# Patient Record
Sex: FEMALE
[De-identification: names, ages, dates, MRNs, and addresses within clinical notes are randomized per-mention and may not be internally consistent; named-entity substitution may affect disease eponyms.]

---

## 2019-11-23 ENCOUNTER — TRANSCRIBE ORDERS (OUTPATIENT)
Dept: OTHER | Age: 54
End: 2019-11-23

## 2019-11-23 DIAGNOSIS — N90.1 VULVAR INTRAEPITHELIAL NEOPLASIA (VIN) GRADE 2: Primary | ICD-10-CM

## 2019-11-26 NOTE — TELEPHONE ENCOUNTER
RECORDS STATUS - ALL OTHER DIAGNOSIS      RECORDS RECEIVED FROM: [facility]   DATE RECEIVED: Care Everywhere   NOTES STATUS DETAILS   OFFICE NOTE from referring provider     OFFICE NOTE from medical oncologist     DISCHARGE SUMMARY from hospital     DISCHARGE REPORT from the ER     OPERATIVE REPORT     MEDICATION LIST     CLINICAL TRIAL TREATMENTS TO DATE     LABS     PATHOLOGY REPORTS     ANYTHING RELATED TO DIAGNOSIS Slides requested 11/26/19  Received 12/3/19 [facility] Case# [id number]   GENONOMIC TESTING     TYPE:     IMAGING (NEED IMAGES & REPORT)     CT SCANS     MRI     MAMMO     ULTRASOUND Requested 11/26/19  Merged to PACS 12/3/19 [facility]   PET

## 2019-11-26 NOTE — TELEPHONE ENCOUNTER
ONCOLOGY INTAKE: Records Information      APPT INFORMATION: [id number] Dr. Gregory Ramirez  Referring provider:  Dr. 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 Yes    Has patient been seen for any external appt for this diagnosis? Yes    If yes, where? [facility]    Has patient had any imaging or procedures outside of [place] for this condition? Yes      If Yes, where? [facility]    ADDITIONAL INFORMATION:  Faxed recs to medical records

## 2019-12-06 PROCEDURE — 00000346 ZZHCL STATISTIC REVIEW OUTSIDE SLIDES TC 88321: Performed by: OBSTETRICS & GYNECOLOGY

## 2019-12-08 LAB — COPATH REPORT: NORMAL

## 2019-12-11 ENCOUNTER — PRE VISIT (OUTPATIENT)
Dept: ONCOLOGY | Facility: CLINIC | Age: 54
End: 2019-12-11

## 2019-12-11 ENCOUNTER — ONCOLOGY VISIT (OUTPATIENT)
Dept: ONCOLOGY | Facility: CLINIC | Age: 54
End: 2019-12-11
Attending: OBSTETRICS & GYNECOLOGY
Payer: COMMERCIAL

## 2019-12-11 VITALS
HEART RATE: 72 BPM | WEIGHT: 205.2 LBS | TEMPERATURE: 97.7 F | OXYGEN SATURATION: 99 % | DIASTOLIC BLOOD PRESSURE: 71 MMHG | RESPIRATION RATE: 14 BRPM | SYSTOLIC BLOOD PRESSURE: 135 MMHG

## 2019-12-11 DIAGNOSIS — N90.1 VULVAR INTRAEPITHELIAL NEOPLASIA (VIN) GRADE 2: ICD-10-CM

## 2019-12-11 PROBLEM — H52.4 MYOPIA OF BOTH EYES WITH ASTIGMATISM AND PRESBYOPIA: Status: ACTIVE | Noted: 2017-11-29

## 2019-12-11 PROBLEM — M79.641 PAIN IN BOTH HANDS: Status: ACTIVE | Noted: 2019-11-13

## 2019-12-11 PROBLEM — H52.13 MYOPIA OF BOTH EYES WITH ASTIGMATISM AND PRESBYOPIA: Status: ACTIVE | Noted: 2017-11-29

## 2019-12-11 PROBLEM — E55.9 VITAMIN D DEFICIENCY: Status: ACTIVE | Noted: 2018-10-25

## 2019-12-11 PROBLEM — M72.0 DUPUYTREN'S CONTRACTURE OF RIGHT HAND: Status: ACTIVE | Noted: 2018-02-08

## 2019-12-11 PROBLEM — H52.203 MYOPIA OF BOTH EYES WITH ASTIGMATISM AND PRESBYOPIA: Status: ACTIVE | Noted: 2017-11-29

## 2019-12-11 PROBLEM — R42 DIZZINESS: Status: ACTIVE | Noted: 2019-08-02

## 2019-12-11 PROBLEM — M79.642 PAIN IN BOTH HANDS: Status: ACTIVE | Noted: 2019-11-13

## 2019-12-11 PROBLEM — E78.49 OTHER HYPERLIPIDEMIA: Status: ACTIVE | Noted: 2018-10-25

## 2019-12-11 PROBLEM — R79.89 ABNORMAL TSH: Status: ACTIVE | Noted: 2018-10-25

## 2019-12-11 PROBLEM — G56.20 CUBITAL TUNNEL SYNDROME: Status: ACTIVE | Noted: 2018-02-08

## 2019-12-11 PROCEDURE — G0463 HOSPITAL OUTPT CLINIC VISIT: HCPCS | Mod: ZF

## 2019-12-11 PROCEDURE — 99205 OFFICE O/P NEW HI 60 MIN: CPT | Mod: ZP | Performed by: OBSTETRICS & GYNECOLOGY

## 2019-12-11 RX ORDER — HEPARIN SODIUM 5000 [USP'U]/.5ML
5000 INJECTION, SOLUTION INTRAVENOUS; SUBCUTANEOUS
Status: CANCELLED | OUTPATIENT
Start: 2019-12-11

## 2019-12-11 RX ORDER — PRAVASTATIN SODIUM 10 MG
10 TABLET ORAL EVERY MORNING
Refills: 0 | COMMUNITY
Start: 2019-10-29

## 2019-12-11 RX ORDER — CEFAZOLIN SODIUM 1 G/50ML
1 INJECTION, SOLUTION INTRAVENOUS SEE ADMIN INSTRUCTIONS
Status: CANCELLED | OUTPATIENT
Start: 2019-12-11

## 2019-12-11 RX ORDER — ASPIRIN 81 MG/1
81 TABLET, CHEWABLE ORAL EVERY MORNING
COMMUNITY
Start: 2019-10-03

## 2019-12-11 RX ORDER — CEFAZOLIN SODIUM 2 G/50ML
2 SOLUTION INTRAVENOUS
Status: CANCELLED | OUTPATIENT
Start: 2019-12-11

## 2019-12-11 RX ORDER — VITAMIN B COMPLEX
1000 TABLET ORAL DAILY
COMMUNITY
Start: 2019-11-06

## 2019-12-11 RX ORDER — PHENAZOPYRIDINE HYDROCHLORIDE 200 MG/1
200 TABLET, FILM COATED ORAL ONCE
Status: CANCELLED | OUTPATIENT
Start: 2019-12-11 | End: 2019-12-11

## 2019-12-11 ASSESSMENT — PAIN SCALES - GENERAL: PAINLEVEL: NO PAIN (0)

## 2019-12-11 NOTE — LETTER
2019     RE: Edilia Atwood  [address]  [address]  [facility] [address]     Dear Colleague,    Thank you for referring your patient, Edliia Atwood, to the [facility]. Please see a copy of my visit note below.                            Consult Notes on Referred Patient    Date: 2019       Dr. Nat Sandra MD  [facility]  [address] [place] [address]       RE: Edilia Atwood  : 1965  ODELL: 2019    Dear Dr. Nat Sandra:    I had the pleasure of seeing your patient Edilia Atwood here at the Gynecologic Cancer Clinic at the [facility] on 2019.  As you know she is a very pleasant 53 year old woman with a recent diagnosis of DRE 2.  Given these findings she was subsequently sent to the Gynecologic Cancer Clinic for new patient consultation.   G2, P2, 2 prior vaginal deliveries, went through menopause about 3 years ago, had episode of postmenopausal bleeding and D&C revealed endometrial polyp.  Stripe was 6 mm.  She has otherwise been eating and drinking well.  No nausea, vomiting, fever or chills.  She has also been diagnosed with a VIN2 of the vulva.  She did have a remote history of cervical dysplasia about 30 years ago, has not had a Pap smear within 10 years.  Recent Pap smear was normal.       No more episodes of postmenopausal bleeding after that episode and she underwent D&C.             Past Medical History:  1.  Stroke.     2.  Cervical dysplasia.           Past Surgical History:  1.  Tubal ligation.    2.  Shoulder surgery.           Health Maintenance:  Health Maintenance Due   Topic Date Due     PREVENTIVE CARE VISIT  1965     COLONOSCOPY  12/15/1976     DTAP/TDAP/TD IMMUNIZATION (1 - Tdap) 12/15/1977     HIV SCREENING  12/15/1981     PNEUMOCOCCAL IMMUNIZATION 19-64 MEDIUM RISK (1 of 1 - PPSV23) 12/15/1985     HPV  12/15/1987     LIPID  12/15/2011     ZOSTER IMMUNIZATION (1 of 2) 12/15/2016      PHQ-2  01/01/2019     INFLUENZA VACCINE (1) 09/01/2019       Most recent Pap smear that was ASCUS with high-risk HPV negative.  She had a colonoscopy within this year.             Current Medications:     has a current medication list which includes the following prescription(s): aspirin, pravastatin, vitamin d3, and ferrous sulfate.       Allergies:     [unfilled]        Social History:     Social History     Tobacco Use     Smoking status: Current Every Day Smoker     Smokeless tobacco: Never Used   Substance Use Topics     Alcohol use: Yes       History   Drug Use Unknown           Father had lung cancer.           Physical Exam:     /71   Pulse 72   Temp 97.7  F (36.5  C) (Oral)   Resp 14   Wt 93.1 kg (205 lb 3.2 oz)   SpO2 99%   There is no height or weight on file to calculate BMI.    General Appearance: healthy and alert, no distress     Musculoskeletal: extremities non tender and without edema    Neurological: normal gait, no gross defects     Psychiatric: appropriate mood and affect                                     Assessment:    Edilia Atwood is a 53 year old woman with a new diagnosis of DRE 2.     A total of 60 minutes was spent with the patient, 40 minutes of which were spent in counseling the patient and/or treatment planning.      1.  VIN2.   2.  ASCUS Pap smear, high-risk HPV negative.   3.  Postmenopausal bleeding resolved with benign D and C.    4.  Long-term smoker.      I discussed with the patient, recommended to proceed with exam under anesthesia, colposcopy, wide local excision of vulva.  She agrees with this plan.  She is very appreciative of her care.  We will have her see my colleagues in Anesthesia for preoperative optimization.  All questions were answered.       Risks, benefits and alternatives to proceed discussed in detail with the patient. Risks include but are not limited to bleeding, infection, possible injury to surrounding organs including bowel, bladder, ureter,  need for second procedure/surgery related to complications from first procedure, postoperative medical complications such as cardiopulmonary events, lymphedema, lymphocyst, thromboembolic events. Consent for surgery, blood transfusion signed.  Will arrange appropriate preoperative blood work, CXR, EKG. Patient also advised on need for postoperative surveillance and/or adjuvant therapy. Questions answered.          Thank you for allowing us to participate in the care of your patient.         Sincerely,    Gregory Ramirez MD, MS    Department of Obstetrics and Gynecology   Division of Gynecologic Oncology   [facility]  Phone: [phone]    CC  No care team member to display  KRISTINA BAGLEY

## 2019-12-11 NOTE — PROGRESS NOTES
Consult Notes on Referred Patient    Date: 2019       Dr. Nat Sandra MD  [facility]  [address] [place] [address]       RE: Edilia Atwood  : 1965  ODELL: 2019    Dear Dr. Nat Sandra:    I had the pleasure of seeing your patient Edilia Atwood here at the Gynecologic Cancer Clinic at the [facility] on 2019.  As you know she is a very pleasant 53 year old woman with a recent diagnosis of DRE 2.  Given these findings she was subsequently sent to the Gynecologic Cancer Clinic for new patient consultation.   G2, P2, 2 prior vaginal deliveries, went through menopause about 3 years ago, had episode of postmenopausal bleeding and D&C revealed endometrial polyp.  Stripe was 6 mm.  She has otherwise been eating and drinking well.  No nausea, vomiting, fever or chills.  She has also been diagnosed with a VIN2 of the vulva.  She did have a remote history of cervical dysplasia about 30 years ago, has not had a Pap smear within 10 years.  Recent Pap smear was normal.       No more episodes of postmenopausal bleeding after that episode and she underwent D&C.             Past Medical History:  1.  Stroke.     2.  Cervical dysplasia.           Past Surgical History:  1.  Tubal ligation.    2.  Shoulder surgery.           Health Maintenance:  Health Maintenance Due   Topic Date Due     PREVENTIVE CARE VISIT  1965     COLONOSCOPY  12/15/1976     DTAP/TDAP/TD IMMUNIZATION (1 - Tdap) 12/15/1977     HIV SCREENING  12/15/1981     PNEUMOCOCCAL IMMUNIZATION 19-64 MEDIUM RISK (1 of 1 - PPSV23) 12/15/1985     HPV  12/15/1987     LIPID  12/15/2011     ZOSTER IMMUNIZATION (1 of 2) 12/15/2016     PHQ-2  2019     INFLUENZA VACCINE (1) 2019       Most recent Pap smear that was ASCUS with high-risk HPV negative.  She had a colonoscopy within this year.             Current Medications:     has a current medication list which  includes the following prescription(s): aspirin, pravastatin, vitamin d3, and ferrous sulfate.       Allergies:     [unfilled]        Social History:     Social History     Tobacco Use     Smoking status: Current Every Day Smoker     Smokeless tobacco: Never Used   Substance Use Topics     Alcohol use: Yes       History   Drug Use Unknown           Father had lung cancer.           Physical Exam:     /71   Pulse 72   Temp 97.7  F (36.5  C) (Oral)   Resp 14   Wt 93.1 kg (205 lb 3.2 oz)   SpO2 99%   There is no height or weight on file to calculate BMI.    General Appearance: healthy and alert, no distress     Musculoskeletal: extremities non tender and without edema    Neurological: normal gait, no gross defects     Psychiatric: appropriate mood and affect             Pelvic: left labia majora lesion know dysplasia about 3 cm in diameter, normal vagina cervix, no adnexal masses or tenderness, rectovaginal confirms                            Assessment:    Edilia Atwood is a 53 year old woman with a new diagnosis of DRE 2.     A total of 60 minutes was spent with the patient, 40 minutes of which were spent in counseling the patient and/or treatment planning.      1.  VIN2.   2.  ASCUS Pap smear, high-risk HPV negative.   3.  Postmenopausal bleeding resolved with benign D and C.    4.  Long-term smoker.      I discussed with the patient, recommended to proceed with exam under anesthesia, colposcopy, wide local excision of vulva.  She agrees with this plan.  She is very appreciative of her care.  We will have her see my colleagues in Anesthesia for preoperative optimization.  All questions were answered.       Risks, benefits and alternatives to proceed discussed in detail with the patient. Risks include but are not limited to bleeding, infection, possible injury to surrounding organs including bowel, bladder, ureter, need for second procedure/surgery related to complications from first procedure,  postoperative medical complications such as cardiopulmonary events, lymphedema, lymphocyst, thromboembolic events. Consent for surgery, blood transfusion signed.  Will arrange appropriate preoperative blood work, CXR, EKG. Patient also advised on need for postoperative surveillance and/or adjuvant therapy. Questions answered.          Thank you for allowing us to participate in the care of your patient.         Sincerely,    Gregory Ramirez MD, MS    Department of Obstetrics and Gynecology   Division of Gynecologic Oncology   [facility]  Phone: [phone]    CC  No care team member to display  KRISTINA BAGLEY

## 2019-12-11 NOTE — NURSING NOTE
Pre Op Nurse Teaching Template    Relevant Diagnosis: Vulva dysplasia     Teaching Topic: EUA, wide local excision of vulva     Person(s) involved in teaching :  Patient Alone   Motivation Level:  Asks Questions:    Yes      Eager to Learn:     Yes     Cooperative:          Yes    Receptive (willing. Able to accept information):    Yes      Patient and those who are listed above demonstrates understanding of the following:   Reason for the appointment, diagnosis and treatment plan:   Yes   Knowledge of proper use of medications and conditions for which they are ordered (with special attention to potential side effects or drug interactions): Yes   Which situations necessitate calling provider and whom to contact: Yes         Nutritional needs and diet plan:  Yes      Pain management techniques:     Yes, Pain Scale   Diet:   Yes, [facility] Diet Instructions    Teaching Concerns addressed: Yes    Infection Prevention:  Patient and those who are listed above demonstrate understanding of the following:  Surgical procedure site care taught:   Yes   Signs and symptoms of infection taught: Yes       Instructional Materials Used/Given:  The [place] Before You Surgery Booklet  Pain Assessment Tool   Home Care after Major Abdominal or Vaginal Surgery  Map  Copy of Surgical Consent    Comments:  MICK Crawford RN

## 2019-12-11 NOTE — NURSING NOTE
Oncology Rooming Note    December 11, 2019 3:44 PM   Edilia Atwood is a 53 year old female who presents for:    Chief Complaint   Patient presents with     Oncology Clinic Visit     New; DRE II      Initial Vitals: /71   Pulse 72   Temp 97.7  F (36.5  C) (Oral)   Resp 14   Wt 93.1 kg (205 lb 3.2 oz)   SpO2 99%  There is no height or weight on file to calculate BMI. There is no height or weight on file to calculate BSA.  No Pain (0) Comment: Data Unavailable   No LMP recorded.  Allergies reviewed: Yes  Medications reviewed: Yes    Medications: Medication refills not needed today.  Pharmacy name entered into [employer]: [id number] - [place], [place] - [address] AT [facility] [address]    Clinical concerns: No concerns        Pennie Tolliver CMA

## 2019-12-12 ENCOUNTER — TELEPHONE (OUTPATIENT)
Dept: ONCOLOGY | Facility: CLINIC | Age: 54
End: 2019-12-12

## 2019-12-12 NOTE — TELEPHONE ENCOUNTER
FUTURE VISIT INFORMATION      SURGERY INFORMATION:    Date: 1/10/20    Location: [facility]    Surgeon:  Gregory Ramirez    Anesthesia Type:  General    RECORDS REQUESTED FROM:       Primary Care Provider: Shellie Lui MD- [facility]    Most recent EKG+ [id number]-[facility]    Most recent ECHO: 8/15/19- [facility]

## 2019-12-12 NOTE — TELEPHONE ENCOUNTER
I scheduled surgery for this patient with Dr. Ramirez on 1/10 at [place] [facility] Scheduled per availability.    I called the patient and was able to confirm the scheduled dates.     The RN has completed the education regarding the surgery, and they were provided a surgery packet with instructions and a map.     They are aware that they will receive a call  ~2 days prior to the scheduled procedure and will be given an exact arrival/start time.    PAC/H&P: 1/2 at 9:15 AM     Post-Op: 2/5 at 11:20 AM

## 2020-01-02 ENCOUNTER — TELEPHONE (OUTPATIENT)
Dept: ONCOLOGY | Facility: CLINIC | Age: 55
End: 2020-01-02

## 2020-01-02 ENCOUNTER — PRE VISIT (OUTPATIENT)
Dept: SURGERY | Facility: CLINIC | Age: 55
End: 2020-01-02

## 2020-01-02 ENCOUNTER — OFFICE VISIT (OUTPATIENT)
Dept: SURGERY | Facility: CLINIC | Age: 55
End: 2020-01-02
Payer: COMMERCIAL

## 2020-01-02 ENCOUNTER — ANESTHESIA EVENT (OUTPATIENT)
Dept: SURGERY | Facility: CLINIC | Age: 55
End: 2020-01-02

## 2020-01-02 VITALS
SYSTOLIC BLOOD PRESSURE: 134 MMHG | OXYGEN SATURATION: 97 % | DIASTOLIC BLOOD PRESSURE: 81 MMHG | HEART RATE: 79 BPM | WEIGHT: 207 LBS | TEMPERATURE: 97.9 F | HEIGHT: 69 IN | RESPIRATION RATE: 16 BRPM | BODY MASS INDEX: 30.66 KG/M2

## 2020-01-02 DIAGNOSIS — Z01.818 PREOP EXAMINATION: ICD-10-CM

## 2020-01-02 DIAGNOSIS — Z01.818 PREOP EXAMINATION: Primary | ICD-10-CM

## 2020-01-02 LAB
ANION GAP SERPL CALCULATED.3IONS-SCNC: 5 MMOL/L (ref 3–14)
BUN SERPL-MCNC: 14 MG/DL (ref 7–30)
CALCIUM SERPL-MCNC: 8.9 MG/DL (ref 8.5–10.1)
CHLORIDE SERPL-SCNC: 108 MMOL/L (ref 94–109)
CO2 SERPL-SCNC: 26 MMOL/L (ref 20–32)
CREAT SERPL-MCNC: 0.75 MG/DL (ref 0.52–1.04)
ERYTHROCYTE [DISTWIDTH] IN BLOOD BY AUTOMATED COUNT: 16 % (ref 10–15)
GFR SERPL CREATININE-BSD FRML MDRD: >90 ML/MIN/{1.73_M2}
GLUCOSE SERPL-MCNC: 114 MG/DL (ref 70–99)
HCT VFR BLD AUTO: 40.1 % (ref 35–47)
HGB BLD-MCNC: 11.9 G/DL (ref 11.7–15.7)
MCH RBC QN AUTO: 25.9 PG (ref 26.5–33)
MCHC RBC AUTO-ENTMCNC: 29.7 G/DL (ref 31.5–36.5)
MCV RBC AUTO: 87 FL (ref 78–100)
PLATELET # BLD AUTO: 166 10E9/L (ref 150–450)
POTASSIUM SERPL-SCNC: 3.9 MMOL/L (ref 3.4–5.3)
RBC # BLD AUTO: 4.59 10E12/L (ref 3.8–5.2)
SODIUM SERPL-SCNC: 140 MMOL/L (ref 133–144)
WBC # BLD AUTO: 4.9 10E9/L (ref 4–11)

## 2020-01-02 SDOH — HEALTH STABILITY: MENTAL HEALTH: HOW MANY STANDARD DRINKS CONTAINING ALCOHOL DO YOU HAVE ON A TYPICAL DAY?: 5 OR 6

## 2020-01-02 ASSESSMENT — MIFFLIN-ST. JEOR: SCORE: 1600.39

## 2020-01-02 ASSESSMENT — LIFESTYLE VARIABLES: TOBACCO_USE: 1

## 2020-01-02 NOTE — TELEPHONE ENCOUNTER
Rescheduled surgery with Dr. Ramirez due to him being out of town. [place] [facility] is [place] and has no further questions.

## 2020-01-02 NOTE — ANESTHESIA PREPROCEDURE EVALUATION
Anesthesia Pre-Procedure Evaluation    Patient: Edilia Atwood   MRN:     [id number] Gender:   female   Age:    54 year old :      1965        Preoperative Diagnosis: * No surgery found *        No past medical history on file.   No past surgical history on file.       Anesthesia Evaluation     . Pt has had prior anesthetic. Type: General    History of anesthetic complications   - PONV        ROS/MED HX    ENT/Pulmonary:     (+)MORALES risk factors snores loudly, tobacco use, Current use , . .   (-) asthma and recent URI   Neurologic:     (+)CVA date: 2016 with deficits- visual changes/dizziness,     Cardiovascular:  - neg cardiovascular ROS   (+) ----. : . . . :. . Previous cardiac testing Echodate:8/15/2019results:date: results:ECG reviewed date:2017 results: date: results:          METS/Exercise Tolerance:  3 - Able to walk 1-2 blocks without stopping   Hematologic:  - neg hematologic  ROS      (-) history of blood clots and History of Transfusion   Musculoskeletal: Comment: Bilateral hands  (+) arthritis,  -       GI/Hepatic:  - neg GI/hepatic ROS       Renal/Genitourinary:  - ROS Renal section negative       Endo:  - neg endo ROS       Psychiatric:     (+) psychiatric history anxiety (h/o ETOH abuse)      Infectious Disease:  - neg infectious disease ROS      (-) Recent Fever   Malignancy:   (+)   Remote history of cervical dysplasia     - no malignancy   Other:    (+) no H/O Chronic Pain,                       PHYSICAL EXAM:   Mental Status/Neuro: A/A/O; Age Appropriate   Airway: Facies: Feasible  Mallampati: I  Mouth/Opening: Full  TM distance: > 6 cm  Neck ROM: Full   Respiratory: Auscultation: CTAB     Resp. Rate: Normal     Resp. Effort: Normal      CV: Rhythm: Regular  Rate: Age appropriate  Heart: Normal Sounds  Edema: None   Comments:      Dental: Dentures  Dentures: Upper; Lower; Complete                LABS:  CBC: No results found for: WBC, HGB, HCT, PLT  BMP: No results found for: NA,  POTASSIUM, CHLORIDE, CO2, BUN, CR, GLC  COAGS: No results found for: PTT, INR, FIBR  POC: No results found for: BGM, HCG, HCGS  OTHER: No results found for: PH, LACT, A1C, SOLOMON, PHOS, MAG, ALBUMIN, PROTTOTAL, ALT, AST, GGT, ALKPHOS, BILITOTAL, BILIDIRECT, LIPASE, AMYLASE, JOSE ANGEL, TSH, T4, T3, CRP, SED     Preop Vitals    BP Readings from Last 3 Encounters:   12/11/19 135/71    Pulse Readings from Last 3 Encounters:   12/11/19 72      Resp Readings from Last 3 Encounters:   12/11/19 14    SpO2 Readings from Last 3 Encounters:   12/11/19 99%      Temp Readings from Last 1 Encounters:   12/11/19 97.7  F (36.5  C) (Oral)    Ht Readings from Last 1 Encounters:   No data found for Ht      Wt Readings from Last 1 Encounters:   12/11/19 93.1 kg (205 lb 3.2 oz)    There is no height or weight on file to calculate BMI.     LDA:        JMARTG FV AN PLAN NO PONV RULE       PAC Discussion and Assessment    ASA Classification: 3  Case is suitable for: ASC  Anesthetic techniques and relevant risks discussed: GA  Invasive monitoring and risk discussed: No  Types:   Possibility and Risk of blood transfusion discussed: No  NPO instructions given:   Additional anesthetic preparation and risks discussed:   Needs early admission to pre-op area:   Other:     PAC Resident/NP Anesthesia Assessment:  Edilia Atwood is a 54 year old female scheduled for EUA, colposcopy, WLE of vulva on 1/10/2020 by Dr. Ramirez in treatment of VIN2.  PAC referral for risk assessment and optimization for anesthesia with comorbid conditions of smoking, h/o CVA 2/2016, dizziness:    Pre-operative considerations:  1.  Cardiac:  Functional status- METS 4. Pt does her own housework, can climb a flight of stairs and walk 2 blocks without cardiopulmonary symptoms.  Low risk surgery with 0.9% (RCRI 1) risk of major adverse cardiac event.   -denies CP, SOB, WEBER, palpitaions  -Holter Monitor 11/13/19 - 11/14/19:   +Predominant rhythm: Sinus rhythm  +Heart rate range: 56  "bpm to 146 bpm, average 81 bpm  +Symptom Diary : symptoms of lightheadedness, dizziness and   anxiety correlated with sinus rhythm or low grade sinus   tachycardia.    +There was one 4-beat run of regular SVT that likely was   atrial tachycardia.  There were no associated symptoms.    +There were rare PACs.  No PVC.   +No atrial fibrillation, pauses > 3 seconds or other   significant dysrhythmia.   -EKG 12/9/2017: SR  -Echo: 8/15/2019, EF 65%, no wall motion abnormalities    2.  Pulm:  Airway feasible.  MORALES risk: low, STOPBANG 2/8  -current smoker, one PPD sometimes less    3.  GI:  Risk of PONV score = 1.  If > 2, anti-emetic intervention recommended.    4.  Neuro  -h/o CVA 2/2016.  Pt has had symptom of dizziness since stroke.  Per her chart, she has not had syncope.  Symptoms occur randomly and mostly when seated.  Occasionally when she stands up.  She is followed by Dr. Lui for this with [facility]  Recent Holter Monitor, see #1 above.  Considering neurology referral.    5.  Psych  -h/o ETOH abuse.  Pt reports that she drinks 6 beers daily.  She denies ever having withdrawal symptoms when abstaining.  She states she has \"gone weeks without ETOH\".  -reports anxiety, no treatment    VTE risk: 1.8% due to FH of VTE      **For further details of assessment, testing, and physical exam please see H and P completed on same date.          Katherine Flanagan PA-C, [facility]      Reviewed and Signed by PAC Mid-Level Provider/Resident  Mid-Level Provider/Resident: Katherine Flanagan  Date: 1/2/2020  Time:     Attending Anesthesiologist Anesthesia Assessment:        Anesthesiologist:   Date:   Time:   Pass/Fail:   Disposition:     PAC Pharmacist Assessment:        Pharmacist:   Date:   Time:    Katherine Flanagan PA-C  "

## 2020-01-02 NOTE — H&P
Pre-Operative H & P     CC:  Preoperative exam to assess for increased cardiopulmonary risk while undergoing surgery and anesthesia.    Date of Encounter: 1/2/2020  Primary Care Physician:    Associated Diagnosis: DRE 2    HPI  Edilia Atwood is a 54 year old female who presents for pre-operative H & P in preparation for EUA, colposcopy, WLE of vulva with Dr. Ramirez on 1/10/2020 at [facility]. General Anesthesia.    This is a 54-year-old female with history of CVA in February 2016, persistent dizziness, EtOH abuse, cervical dysplasia.  Patient's last menstrual period was approximately 18 months ago.  In July 2019 she experienced some vaginal bleeding.  Her primary care provider did recommend further evaluation with gynecology.  Subsequent pelvic ultrasound revealed an endometrial stripe of approximately 0.6 cm.  Patient then underwent hysteroscopy with endometrial ablation and biopsy on 10/30/2019.  Biopsies of the endometrium were negative, however biopsy of the left labia did show condyloma with moderate dysplasia,   vulvar intraepithelial neoplasia 2.  She was then referred to the [facility] gynecologic oncology clinic for further evaluation and treatment and the above procedure is recommended.    History is obtained from the patient.     Past Medical History  Past Medical History:   Diagnosis Date     Stroke (cerebrum) (H) 02/2016       Past Surgical History  Past Surgical History:   Procedure Laterality Date     HYSTEROSCOPY  10/30/2019     SHOULDER SURGERY Left 2000     TUBAL LIGATION  1991       Hx of Blood transfusions/reactions: none     Hx of abnormal bleeding or anti-platelet use: ASA 81mg    Menstrual history: No LMP recorded.:     Steroid use in the last year: none    Personal or FH with difficulty with Anesthesia:  none    Prior to Admission Medications  Current Outpatient Medications   Medication Sig Dispense Refill     aspirin (ASA) 81 MG chewable tablet  Take 81 mg by mouth every morning        pravastatin (PRAVACHOL) 10 MG tablet Take 10 mg by mouth every morning   0     Vitamin D3 (VITAMIN D3) 25 mcg (1000 units) tablet Take 1,000 mcg by mouth daily          Allergies  Allergies   Allergen Reactions     Chicken-Derived Products (Egg) Nausea and Vomiting     Codeine Other (See Comments)     faints       Tramadol Other (See Comments)     faints  fainting       Social History  Social History     Socioeconomic History     Marital status: Single     Spouse name: Not on file     Number of children: Not on file     Years of education: Not on file     Highest education level: Not on file   Occupational History     Not on file   Social Needs     Financial resource strain: Not on file     Food insecurity:     Worry: Not on file     Inability: Not on file     Transportation needs:     Medical: Not on file     Non-medical: Not on file   Tobacco Use     Smoking status: Current Every Day Smoker     Types: Cigarettes     Smokeless tobacco: Never Used     Tobacco comment: 1ppd   Substance and Sexual Activity     Alcohol use: Yes     Drinks per session: 5 or 6     Comment: 6 beers per day     Drug use: Never     Sexual activity: Not on file   Lifestyle     Physical activity:     Days per week: Not on file     Minutes per session: Not on file     Stress: Not on file   Relationships     Social connections:     Talks on phone: Not on file     Gets together: Not on file     Attends Islam service: Not on file     Active member of club or organization: Not on file     Attends meetings of clubs or organizations: Not on file     Relationship status: Not on file     Intimate partner violence:     Fear of current or ex partner: Not on file     Emotionally abused: Not on file     Physically abused: Not on file     Forced sexual activity: Not on file   Other Topics Concern     Not on file   Social History Narrative     Not on file       Family History  Family History   Problem Relation  "Age of Onset     Emphysema Mother      Heart Disease Mother      Diabetes Mother      Lung Cancer Father            Anesthesia Evaluation     . Pt has had prior anesthetic. Type: General    History of anesthetic complications   - PONV        ROS/MED HX  The complete review of systems is negative other than noted in the HPI or here.  ENT/Pulmonary:     (+)MORALES risk factors snores loudly, tobacco use, Current use , . .   (-) asthma and recent URI   Neurologic:     (+)CVA date: 2/2016 with deficits- visual changes/dizziness,     Cardiovascular:  - neg cardiovascular ROS   (+) ----. : . . . :. . Previous cardiac testing Echodate:8/15/2019results:date: results:ECG reviewed date:12/9/2017 results: date: results:          METS/Exercise Tolerance:  3 - Able to walk 1-2 blocks without stopping   Hematologic:  - neg hematologic  ROS      (-) history of blood clots and History of Transfusion   Musculoskeletal: Comment: Bilateral hands  (+) arthritis,  -       GI/Hepatic:  - neg GI/hepatic ROS       Renal/Genitourinary:  - ROS Renal section negative       Endo:  - neg endo ROS       Psychiatric:     (+) psychiatric history anxiety (h/o ETOH abuse)      Infectious Disease:  - neg infectious disease ROS      (-) Recent Fever   Malignancy:   (+)   Remote history of cervical dysplasia     - no malignancy   Other:    (+) no H/O Chronic Pain,           PHYSICAL EXAM:   Mental Status/Neuro: A/A/O; Age Appropriate   Airway: Facies: Feasible  Mallampati: I  Mouth/Opening: Full  TM distance: > 6 cm  Neck ROM: Full   Respiratory: Auscultation: CTAB     Resp. Rate: Normal     Resp. Effort: Normal      CV: Rhythm: Regular  Rate: Age appropriate  Heart: Normal Sounds  Edema: None   Comments:      Dental: Dentures  Dentures: Upper; Lower; Complete                 Temp: 97.9  F (36.6  C) Temp src: Oral BP: 134/81 Pulse: 79   Resp: 16 SpO2: 97 %         207 lbs 0 oz  5' 8.5\"   Body mass index is 31.02 kg/m .       Physical Exam  Constitutional: " Awake, alert, cooperative, no apparent distress, and appears stated age.  Eyes: Pupils equal, round and reactive to light, extra ocular muscles intact, sclera clear, conjunctiva normal.  HENT: Normocephalic, oral pharynx with moist mucus membranes, good dentition. No goiter appreciated.   Respiratory: Clear to auscultation bilaterally, no crackles or wheezing.  Cardiovascular: Regular rate and rhythm, normal S1 and S2, and no murmur noted.  Carotids +2, no bruits. No edema. Palpable pulses to radial  DP and PT arteries.   GI: Normal bowel sounds, soft, obese abdomen  Lymph/Hematologic: No cervical lymphadenopathy and no supraclavicular lymphadenopathy.  Genitourinary:  deferred  Skin: Warm and dry.    Musculoskeletal: Full ROM of neck. There is no redness, warmth, or swelling of the joints. Gross motor strength is normal.    Neurologic: Awake, alert, oriented to name, place and time. Cranial nerves II-XII are grossly intact. Gait is normal.   Neuropsychiatric: Calm, cooperative. Normal affect.     Labs: (personally reviewed)  Component      Latest Ref Rng & Units 2020   WBC      4.0 - 11.0 10e9/L 4.9   RBC Count      3.8 - 5.2 10e12/L 4.59   Hemoglobin      11.7 - 15.7 g/dL 11.9   Hematocrit      35.0 - 47.0 % 40.1   MCV      78 - 100 fl 87   MCH      26.5 - 33.0 pg 25.9 (L)   MCHC      31.5 - 36.5 g/dL 29.7 (L)   RDW      10.0 - 15.0 % 16.0 (H)   Platelet Count      150 - 450 10e9/L 166     Component      Latest Ref Rng & Units 2020   Sodium      133 - 144 mmol/L 140   Potassium      3.4 - 5.3 mmol/L 3.9   Chloride      94 - 109 mmol/L 108   Carbon Dioxide      20 - 32 mmol/L 26   Anion Gap      3 - 14 mmol/L 5   Glucose      70 - 99 mg/dL 114 (H)   Urea Nitrogen      7 - 30 mg/dL 14   Creatinine      0.52 - 1.04 mg/dL 0.75   GFR Estimate      >60 mL/min/1.73:m2 >90   GFR Estimate If Black      >60 mL/min/1.73:m2 >90   Calcium      8.5 - 10.1 mg/dL 8.9         EK2017  SR    Cardiac echo:  8/15/2019  The estimated left ventricular ejection fraction is 65 %.  There is no left ventricular wall motion abnormality identified.  No tricuspid regurgitation was present, so it was not possible to estimate  PA systolic pressure.  Based on IVC geometry, the right atrial pressure is probably normal.  Normal estimated left ventricular ejection fraction .  No wall motion abnormality .  Normal left ventricular cavity size.      Outside records reviewed from: care everywhere    ASSESSMENT and PLAN  Edilia Atwood is a 54 year old female scheduled for EUA, colposcopy, WLE of vulva on 1/10/2020 by Dr. Ramirez in treatment of VIN2.  PAC referral for risk assessment and optimization for anesthesia with comorbid conditions of smoking, h/o CVA 2/2016, dizziness:    Pre-operative considerations:  1.  Cardiac:  Functional status- METS 4. Pt does her own housework, can climb a flight of stairs and walk 2 blocks without cardiopulmonary symptoms.  Low risk surgery with 0.9% (RCRI 1) risk of major adverse cardiac event.   -denies CP, SOB, WEBER, palpitaions  -Holter Monitor 11/13/19 - 11/14/19:   +Predominant rhythm: Sinus rhythm  +Heart rate range: 56 bpm to 146 bpm, average 81 bpm  +Symptom Diary : symptoms of lightheadedness, dizziness and   anxiety correlated with sinus rhythm or low grade sinus   tachycardia.    +There was one 4-beat run of regular SVT that likely was   atrial tachycardia.  There were no associated symptoms.    +There were rare PACs.  No PVC.   +No atrial fibrillation, pauses > 3 seconds or other   significant dysrhythmia.   -EKG 12/9/2017: SR  -Echo: 8/15/2019, EF 65%, no wall motion abnormalities    2.  Pulm:  Airway feasible.  MORALES risk: low, STOPBANG 2/8  -current smoker, one PPD sometimes less    3.  GI:  Risk of PONV score = 2.  Pt reports today that she has had post-op nausea in the past.    4.  Neuro  -h/o CVA 2/2016.  Pt has had symptom of dizziness since stroke.  Per her chart, she has not had  "syncope.  Symptoms occur randomly and mostly when seated.  Occasionally when she stands up.  She is followed by Dr. Lui for this with [facility].  Recent Holter Monitor, see #1 above.  Considering neurology referral.    5.  Psych  -h/o ETOH abuse.  Pt reports that she drinks 6 beers daily.  She denies ever having withdrawal symptoms when abstaining.  She states she has \"gone weeks without ETOH\".  -reports anxiety, no treatment    VTE risk: 1.8% due to FH of VTE        Katherine Flanagan PA-C  Preoperative Assessment Center  [facility]  Phone: [phone]  Fax: [phone]  "

## 2020-01-02 NOTE — PATIENT INSTRUCTIONS
Preparing for Your Surgery      Name:  Edilia Atwood   MRN:  [id number]   :  1965   Today's Date:  2020     Arriving for surgery:  Surgery date:  1/10/20  Arrival time:  10:30 am    Please come to:     [facility]  [address] [place] [id number]     Parking is available in front of the Clinics and Surgery Center building from 5:30AM to 8:00PM.  -  Proceed to the 5th floor to check into the Ambulatory Surgery Center.              >> There will be patient concierges on the 1st and 5th floor, for assistance or an escort, if you would like.              >> Please call [phone] with any questions.    -  Bring your ID and insurance card.    - If you are scheduled to go home the Same Day as surgery you must have a responsible adult as a  and to stay with you overnight the first 24 hours after surgery.     What can I eat or drink?  -  You may have solid food or milk products until 8 hours prior to your surgery. (Until 4 am )  -  You may have water, apple juice or 7up/Sprite until 2 hours prior to your surgery. (Until 10 am )    Which medicines can I take?       -  Do not bring your own medications to the hospital.        -  Follow Gynecology Clinic instructions regarding Ibuprofen. If no instructions given, NO Ibuprofen the day prior to surgery.             -  Hold Naproxen (Aleve) 2 days prior to surgery.         -  Hold Aspirin for 7 days prior to surgery. Last dose 1-2-20.    -  Do NOT take these medications in the morning, the day of surgery:  Vitamin D3    -  Please take these medications the morning of surgery:  Pravastatin  Acetaminophen (Tylenol) if needed    How do I prepare myself?  -  Take two showers: one the night before surgery; and one the morning of surgery.         Use Scrubcare or Hibiclens to wash from neck down.  You may use your own shampoo and conditioner. No other hair products.   -  Do NOT use lotion, powder, colognes, deodorant, or  antiperspirant the day of your surgery.  -  Do NOT wear any makeup, fingernail polish or jewelry.    Questions or Concerns:  If your surgery is at the Ambulatory Surgery Center, please call [phone]. (Mon - Fri 8 am- 3:30 pm)  Questions about surgery, contact your Surgeons office.  If you have any health changes prior to surgery, please notify your Surgeon.    AFTER YOUR SURGERY  Breathing exercises   Breathing exercises help you recover faster. Take deep breaths and let the air out slowly. This will:     Help you wake up after surgery.    Help prevent complications like pneumonia.  Preventing complications will help you go home sooner.   Nausea and vomiting   You may feel sick to your stomach after surgery; if so, let your nurse know.    Pain control:  After surgery, you may have pain. Our goal is to help you manage your pain. Pain medicine will help you feel comfortable enough to do activities that will help you heal.  These activities may include breathing exercises, walking and physical therapy.   To help your health care team treat your pain we will ask: 1) If you have pain  2) where it is located 3) describe your pain in your words  Methods of pain control include medications given by mouth, vein or by nerve block for some surgeries.  Sequential Compression Device (SCD) or Pneumo Boots:  You may need to wear SCD S on your legs or feet. These are wraps connected to a machine that pumps in air and releases it. The repeated pumping helps prevent blood clots from forming.

## 2020-01-16 ENCOUNTER — ANESTHESIA EVENT (OUTPATIENT)
Dept: SURGERY | Facility: AMBULATORY SURGERY CENTER | Age: 55
End: 2020-01-16

## 2020-01-16 RX ORDER — FENTANYL CITRATE 50 UG/ML
25-50 INJECTION, SOLUTION INTRAMUSCULAR; INTRAVENOUS
Status: CANCELLED | OUTPATIENT
Start: 2020-01-16

## 2020-01-17 ENCOUNTER — HOSPITAL ENCOUNTER (OUTPATIENT)
Facility: AMBULATORY SURGERY CENTER | Age: 55
End: 2020-01-17
Attending: OBSTETRICS & GYNECOLOGY
Payer: COMMERCIAL

## 2020-01-17 ENCOUNTER — ANESTHESIA (OUTPATIENT)
Dept: SURGERY | Facility: AMBULATORY SURGERY CENTER | Age: 55
End: 2020-01-17

## 2020-01-17 VITALS
TEMPERATURE: 97.8 F | SYSTOLIC BLOOD PRESSURE: 122 MMHG | WEIGHT: 205 LBS | RESPIRATION RATE: 16 BRPM | OXYGEN SATURATION: 100 % | HEIGHT: 68 IN | DIASTOLIC BLOOD PRESSURE: 69 MMHG | HEART RATE: 76 BPM | BODY MASS INDEX: 31.07 KG/M2

## 2020-01-17 DIAGNOSIS — Z98.890 HISTORY OF LOCAL EXCISION OF SKIN LESION: Primary | ICD-10-CM

## 2020-01-17 DIAGNOSIS — N90.1 VULVAR INTRAEPITHELIAL NEOPLASIA (VIN) GRADE 2: ICD-10-CM

## 2020-01-17 LAB — HCG UR QL: NORMAL

## 2020-01-17 RX ORDER — DEXAMETHASONE SODIUM PHOSPHATE 4 MG/ML
INJECTION, SOLUTION INTRA-ARTICULAR; INTRALESIONAL; INTRAMUSCULAR; INTRAVENOUS; SOFT TISSUE PRN
Status: DISCONTINUED | OUTPATIENT
Start: 2020-01-17 | End: 2020-01-17

## 2020-01-17 RX ORDER — FENTANYL CITRATE 50 UG/ML
25-50 INJECTION, SOLUTION INTRAMUSCULAR; INTRAVENOUS
Status: DISCONTINUED | OUTPATIENT
Start: 2020-01-17 | End: 2020-01-18 | Stop reason: HOSPADM

## 2020-01-17 RX ORDER — AMOXICILLIN 250 MG
1-2 CAPSULE ORAL 2 TIMES DAILY
Qty: 30 TABLET | Refills: 0 | Status: SHIPPED | OUTPATIENT
Start: 2020-01-17 | End: 2020-02-03

## 2020-01-17 RX ORDER — OXYCODONE HYDROCHLORIDE 5 MG/1
5-10 TABLET ORAL EVERY 4 HOURS PRN
Qty: 6 TABLET | Refills: 0 | Status: SHIPPED | OUTPATIENT
Start: 2020-01-17 | End: 2020-02-03

## 2020-01-17 RX ORDER — PROPOFOL 10 MG/ML
INJECTION, EMULSION INTRAVENOUS PRN
Status: DISCONTINUED | OUTPATIENT
Start: 2020-01-17 | End: 2020-01-17

## 2020-01-17 RX ORDER — SODIUM CHLORIDE, SODIUM LACTATE, POTASSIUM CHLORIDE, CALCIUM CHLORIDE 600; 310; 30; 20 MG/100ML; MG/100ML; MG/100ML; MG/100ML
INJECTION, SOLUTION INTRAVENOUS CONTINUOUS
Status: DISCONTINUED | OUTPATIENT
Start: 2020-01-17 | End: 2020-01-18 | Stop reason: HOSPADM

## 2020-01-17 RX ORDER — LIDOCAINE 40 MG/G
CREAM TOPICAL
Status: DISCONTINUED | OUTPATIENT
Start: 2020-01-17 | End: 2020-01-18 | Stop reason: HOSPADM

## 2020-01-17 RX ORDER — KETOROLAC TROMETHAMINE 30 MG/ML
INJECTION, SOLUTION INTRAMUSCULAR; INTRAVENOUS PRN
Status: DISCONTINUED | OUTPATIENT
Start: 2020-01-17 | End: 2020-01-17

## 2020-01-17 RX ORDER — FENTANYL CITRATE 50 UG/ML
INJECTION, SOLUTION INTRAMUSCULAR; INTRAVENOUS PRN
Status: DISCONTINUED | OUTPATIENT
Start: 2020-01-17 | End: 2020-01-17

## 2020-01-17 RX ORDER — ALBUTEROL SULFATE 0.83 MG/ML
2.5 SOLUTION RESPIRATORY (INHALATION) EVERY 4 HOURS PRN
Status: DISCONTINUED | OUTPATIENT
Start: 2020-01-17 | End: 2020-01-18 | Stop reason: HOSPADM

## 2020-01-17 RX ORDER — EPHEDRINE SULFATE 50 MG/ML
INJECTION, SOLUTION INTRAMUSCULAR; INTRAVENOUS; SUBCUTANEOUS PRN
Status: DISCONTINUED | OUTPATIENT
Start: 2020-01-17 | End: 2020-01-17

## 2020-01-17 RX ORDER — PROPOFOL 10 MG/ML
INJECTION, EMULSION INTRAVENOUS CONTINUOUS PRN
Status: DISCONTINUED | OUTPATIENT
Start: 2020-01-17 | End: 2020-01-17

## 2020-01-17 RX ORDER — GABAPENTIN 300 MG/1
300 CAPSULE ORAL ONCE
Status: COMPLETED | OUTPATIENT
Start: 2020-01-17 | End: 2020-01-17

## 2020-01-17 RX ORDER — ACETAMINOPHEN 325 MG/1
975 TABLET ORAL ONCE
Status: COMPLETED | OUTPATIENT
Start: 2020-01-17 | End: 2020-01-17

## 2020-01-17 RX ORDER — DEXAMETHASONE SODIUM PHOSPHATE 4 MG/ML
4 INJECTION, SOLUTION INTRA-ARTICULAR; INTRALESIONAL; INTRAMUSCULAR; INTRAVENOUS; SOFT TISSUE EVERY 10 MIN PRN
Status: DISCONTINUED | OUTPATIENT
Start: 2020-01-17 | End: 2020-01-18 | Stop reason: HOSPADM

## 2020-01-17 RX ORDER — MEPERIDINE HYDROCHLORIDE 25 MG/ML
12.5 INJECTION INTRAMUSCULAR; INTRAVENOUS; SUBCUTANEOUS
Status: DISCONTINUED | OUTPATIENT
Start: 2020-01-17 | End: 2020-01-18 | Stop reason: HOSPADM

## 2020-01-17 RX ORDER — CEFAZOLIN SODIUM 2 G/50ML
2 SOLUTION INTRAVENOUS
Status: COMPLETED | OUTPATIENT
Start: 2020-01-17 | End: 2020-01-17

## 2020-01-17 RX ORDER — ONDANSETRON 4 MG/1
4 TABLET, ORALLY DISINTEGRATING ORAL EVERY 30 MIN PRN
Status: DISCONTINUED | OUTPATIENT
Start: 2020-01-17 | End: 2020-01-18 | Stop reason: HOSPADM

## 2020-01-17 RX ORDER — ACETAMINOPHEN 325 MG/1
650 TABLET ORAL EVERY 4 HOURS PRN
Qty: 50 TABLET | Refills: 0 | Status: SHIPPED | OUTPATIENT
Start: 2020-01-17

## 2020-01-17 RX ORDER — NALOXONE HYDROCHLORIDE 0.4 MG/ML
.1-.4 INJECTION, SOLUTION INTRAMUSCULAR; INTRAVENOUS; SUBCUTANEOUS
Status: DISCONTINUED | OUTPATIENT
Start: 2020-01-17 | End: 2020-01-18 | Stop reason: HOSPADM

## 2020-01-17 RX ORDER — CEFAZOLIN SODIUM 1 G/50ML
1 SOLUTION INTRAVENOUS SEE ADMIN INSTRUCTIONS
Status: DISCONTINUED | OUTPATIENT
Start: 2020-01-17 | End: 2020-01-18 | Stop reason: HOSPADM

## 2020-01-17 RX ORDER — HEPARIN SODIUM 10000 [USP'U]/ML
5000 INJECTION, SOLUTION INTRAVENOUS; SUBCUTANEOUS
Status: DISCONTINUED | OUTPATIENT
Start: 2020-01-17 | End: 2020-01-18 | Stop reason: HOSPADM

## 2020-01-17 RX ORDER — HYDROMORPHONE HYDROCHLORIDE 1 MG/ML
.3-.5 INJECTION, SOLUTION INTRAMUSCULAR; INTRAVENOUS; SUBCUTANEOUS EVERY 10 MIN PRN
Status: DISCONTINUED | OUTPATIENT
Start: 2020-01-17 | End: 2020-01-18 | Stop reason: HOSPADM

## 2020-01-17 RX ORDER — ONDANSETRON 2 MG/ML
INJECTION INTRAMUSCULAR; INTRAVENOUS PRN
Status: DISCONTINUED | OUTPATIENT
Start: 2020-01-17 | End: 2020-01-17

## 2020-01-17 RX ORDER — OXYCODONE HYDROCHLORIDE 5 MG/1
5-10 TABLET ORAL EVERY 4 HOURS PRN
Status: DISCONTINUED | OUTPATIENT
Start: 2020-01-17 | End: 2020-01-18 | Stop reason: HOSPADM

## 2020-01-17 RX ORDER — ACETIC ACID 5 %
LIQUID (ML) MISCELLANEOUS PRN
Status: DISCONTINUED | OUTPATIENT
Start: 2020-01-17 | End: 2020-01-17 | Stop reason: HOSPADM

## 2020-01-17 RX ORDER — ACETAMINOPHEN 325 MG/1
650 TABLET ORAL
Status: CANCELLED | OUTPATIENT
Start: 2020-01-17

## 2020-01-17 RX ORDER — PHENAZOPYRIDINE HYDROCHLORIDE 100 MG/1
200 TABLET, FILM COATED ORAL ONCE
Status: COMPLETED | OUTPATIENT
Start: 2020-01-17 | End: 2020-01-17

## 2020-01-17 RX ORDER — ONDANSETRON 2 MG/ML
4 INJECTION INTRAMUSCULAR; INTRAVENOUS EVERY 30 MIN PRN
Status: DISCONTINUED | OUTPATIENT
Start: 2020-01-17 | End: 2020-01-18 | Stop reason: HOSPADM

## 2020-01-17 RX ORDER — KETOROLAC TROMETHAMINE 30 MG/ML
30 INJECTION, SOLUTION INTRAMUSCULAR; INTRAVENOUS EVERY 6 HOURS PRN
Status: DISCONTINUED | OUTPATIENT
Start: 2020-01-17 | End: 2020-01-18 | Stop reason: HOSPADM

## 2020-01-17 RX ADMIN — FENTANYL CITRATE 50 MCG: 50 INJECTION, SOLUTION INTRAMUSCULAR; INTRAVENOUS at 10:19

## 2020-01-17 RX ADMIN — DEXAMETHASONE SODIUM PHOSPHATE 4 MG: 4 INJECTION, SOLUTION INTRA-ARTICULAR; INTRALESIONAL; INTRAMUSCULAR; INTRAVENOUS; SOFT TISSUE at 10:06

## 2020-01-17 RX ADMIN — GABAPENTIN 300 MG: 300 CAPSULE ORAL at 08:45

## 2020-01-17 RX ADMIN — ONDANSETRON 4 MG: 2 INJECTION INTRAMUSCULAR; INTRAVENOUS at 10:30

## 2020-01-17 RX ADMIN — EPHEDRINE SULFATE 10 MG: 50 INJECTION, SOLUTION INTRAMUSCULAR; INTRAVENOUS; SUBCUTANEOUS at 10:12

## 2020-01-17 RX ADMIN — KETOROLAC TROMETHAMINE 30 MG: 30 INJECTION, SOLUTION INTRAMUSCULAR; INTRAVENOUS at 10:30

## 2020-01-17 RX ADMIN — CEFAZOLIN SODIUM 2 G: 2 SOLUTION INTRAVENOUS at 10:10

## 2020-01-17 RX ADMIN — ACETAMINOPHEN 975 MG: 325 TABLET ORAL at 08:45

## 2020-01-17 RX ADMIN — SODIUM CHLORIDE, SODIUM LACTATE, POTASSIUM CHLORIDE, CALCIUM CHLORIDE: 600; 310; 30; 20 INJECTION, SOLUTION INTRAVENOUS at 08:45

## 2020-01-17 RX ADMIN — PHENAZOPYRIDINE HYDROCHLORIDE 200 MG: 100 TABLET, FILM COATED ORAL at 08:44

## 2020-01-17 RX ADMIN — FENTANYL CITRATE 50 MCG: 50 INJECTION, SOLUTION INTRAMUSCULAR; INTRAVENOUS at 10:03

## 2020-01-17 RX ADMIN — PROPOFOL 200 MCG/KG/MIN: 10 INJECTION, EMULSION INTRAVENOUS at 10:03

## 2020-01-17 RX ADMIN — PROPOFOL 200 MG: 10 INJECTION, EMULSION INTRAVENOUS at 10:03

## 2020-01-17 RX ADMIN — PROPOFOL 50 MG: 10 INJECTION, EMULSION INTRAVENOUS at 10:19

## 2020-01-17 ASSESSMENT — LIFESTYLE VARIABLES: TOBACCO_USE: 1

## 2020-01-17 ASSESSMENT — MIFFLIN-ST. JEOR: SCORE: 1583.37

## 2020-01-17 NOTE — ANESTHESIA CARE TRANSFER NOTE
Patient: Edilia Atwood    Procedure(s):  Exam Under Anesthesia, Coloposcopy of the Vagina, Cervix and Vulva, Wide Local Excision of the Vulva    Diagnosis: Vulvar intraepithelial neoplasia (DRE) grade 2 [N90.1]  Diagnosis Additional Information: No value filed.    Anesthesia Type:   No value filed.     Note:  Airway :Room Air  Patient transferred to:PACU  Comments: VSS/WNL. Responds well.Handoff Report: Identifed the Patient, Identified the Reponsible Provider, Reviewed the pertinent medical history, Discussed the surgical course, Reviewed Intra-OP anesthesia mangement and issues during anesthesia, Set expectations for post-procedure period and Allowed opportunity for questions and acknowledgement of understanding      Vitals: (Last set prior to Anesthesia Care Transfer)    CRNA VITALS  1/17/2020 1009 - 1/17/2020 1044      1/17/2020             SpO2:  98 %    Resp Rate (observed):  (!) 2                Electronically Signed By: YANICK Desouza CRNA  January 17, 2020  10:44 AM

## 2020-01-17 NOTE — ANESTHESIA POSTPROCEDURE EVALUATION
Anesthesia POST Procedure Evaluation    Patient: Edilia Atwood   MRN:     [id number] Gender:   female   Age:    54 year old :      1965        Preoperative Diagnosis: Vulvar intraepithelial neoplasia (DRE) grade 2 [N90.1]   Procedure(s):  Exam Under Anesthesia, Coloposcopy of the Vagina, Cervix and Vulva, Wide Local Excision of the Vulva   Postop Comments: No value filed.       Anesthesia Type:  No value filed.  No value filed.    Reportable Event: NO     PAIN: Uncomplicated   Sign Out status: Comfortable, Well controlled pain     PONV: No PONV   Sign Out status:  No Nausea or Vomiting     Neuro/Psych: Uneventful perioperative course   Sign Out Status: Preoperative baseline; Age appropriate mentation     Airway/Resp.: Uneventful perioperative course   Sign Out Status: Non labored breathing, age appropriate RR; Resp. Status within EXPECTED Parameters     CV: Uneventful perioperative course   Sign Out status: Appropriate BP and perfusion indices; Appropriate HR/Rhythm     Disposition:   Sign Out in:  PACU  Disposition:  Phase II; Home  Recovery Course: Uneventful  Follow-Up: Not required           Last Anesthesia Record Vitals:  CRNA VITALS  2020 1009 - 2020 1109      2020             SpO2:  98 %    Resp Rate (observed):  (!) 2          Last PACU Vitals:  Vitals Value Taken Time   /80 2020 10:45 AM   Temp 36.5  C (97.7  F) 2020 10:45 AM   Pulse 78 2020 10:45 AM   Resp 17 2020 10:50 AM   SpO2 97 % 2020 10:50 AM   Temp src     NIBP     Pulse     SpO2     Resp     Temp     Ht Rate     Temp 2     Vitals shown include unvalidated device data.      Electronically Signed By: Vignesh Vazquez MD, 2020, 1:10 PM

## 2020-01-17 NOTE — DISCHARGE INSTRUCTIONS
[facility]  Home Care Following Anesthesia  For 24 hours after surgery:  1. Get plenty of rest.  A responsible adult must stay with you for at least 24 hours after you leave the surgery center.  2. Do not drive or use heavy equipment.  If you have weakness or tingling, don't drive or use heavy equipment until this feeling goes away.   3. Do not drink alcohol.   4. Avoid strenuous or risky activities.  Ask for help when climbing stairs.  5. You may feel lightheaded.  IF so, sit for a few minutes before standing.  Have someone help you get up.   6. If you have nausea (feel sick to your stomach): Drink only clear liquids such as apple juice, ginger ale, broth or 7-Up.  Rest may also help.  Be sure to drink enough fluids.  Move to a regular diet as you feel able.   7. You may have a slight fever.  Call the doctor if your fever is over 100 F (37.7 C) (taken under the tongue) or lasts longer than 24 hours.  8. You may have a dry mouth, a sore throat, muscle aches or trouble sleeping. These should go away after 24 hours.  9. Do not make important or legal decisions.               Tips for taking pain medications  To get the best pain relief possible, remember these points:    Take pain medications as directed, before pain becomes severe.    Pain medication can upset your stomach: taking it with food may help.    Constipation is a common side effect of pain medication. Drink plenty of  fluids.    Eat foods high in fiber. Take a stool softener if recommended by your doctor or pharmacist.    Do not drink alcohol, drive or operate machinery while taking pain medications.    Ask about other ways to control pain, such as with heat, ice or relaxation.    Tylenol/Acetaminophen Consumption  To help encourage the safe use of acetaminophen, the makers of TYLENOL  have lowered the maximum daily dose for single-ingredient Extra Strength TYLENOL  (acetaminophen) products sold in the [place] from 8  pills per day (4,000 mg) to 6 pills per day (3,000 mg). The dosing interval has also changed from 2 pills every 4-6 hours to 2 pills every 6 hours.    If you feel your pain relief is insufficient, you may take Tylenol/Acetaminophen in addition to your narcotic pain medication.     Be careful not to exceed 3,000 mg of Tylenol/Acetaminophen in a 24 hour period from all sources.    If you are taking extra strength Tylenol/acetaminophen (500 mg), the maximum dose is 6 tablets in 24 hours.    If you are taking regular strength acetaminophen (325 mg), the maximum dose is 9 tablets in 24 hours.    Call a doctor for any of the followin. Signs of infection (fever, growing tenderness at the surgery site, a large amount of drainage or bleeding, severe pain, foul-smelling drainage, redness, swelling).  2. It has been over 8 to 10 hours since surgery and you are still not able to urinate (pass water).  3. Headache for over 24 hours.  4. Numbness, tingling or weakness the day after surgery (if you had spinal anesthesia).  Your doctor is:       Dr. Gregory Black, Gynecologic Oncology: [phone]               Or dial [phone] and ask for the resident on call for:  Gynecologic Oncology  For emergency care, call the:  [facility]:  [phone] (TTY for hearing impaired: [phone])

## 2020-01-17 NOTE — OP NOTE
[facility]  Operative Note    Name: Edilia Atwood  MRN: [id number]  : 1965  Date of Surgery: 2020    Pre-operative Diagnosis: 54 year old; DRE 2, remote history of cervical dysplasia  Post-operative Diagnosis: Same, s/p procedure listed below  Procedure(s): Exam Under Anesthesia, Coloposcopy of the Vagina, Cervix and Vulva, Wide Local Excision of the Vulva    Surgeon: Gregory Ramirez MD   Assistants: Cass Morales MD, PGY-1    Anesthesia: General  EBL: 10 mL   Urine Output: Not measured   Fluids: 500 mL colloid    Specimens: Wide local excision of left labia majora from 2 o'clock to 4 o'clock   Complications: None apparent  Indications: Edilia Atwood is a 54 year old with DRE 2 diagnosed from left vulvar biopsy who was recommended to undergo wide local excision of the vulva.  The risks and benefits of and alternatives to the procedure were discussed with the patient, all questions were answered and written informed consent was obtained.       Findings: Exam under anesthesia revealed normal appearing cervix and vaginal vault.  No acetowhite changes outside of a 2 x 3 cm darkly pigmented and raised left labial lesion. Excised lesion with margins and repaired in two layers. Good hemostasis. Vaginal-rectal exam normal at the end of the case.    Procedure Details: The patient was taken to the OR where she was placed in the dorsal lithotomy position with feet in Archie stirrups. General anesthesia was administered. The patient was prepped and draped in the usual sterile fashion.    An exam under anesthesia was performed which revealed findings as described above.  Acetic acid was applied to the vulva vagina and cervix.  Speculum was placed and the cervix was well-visualized without any obvious lesions.  Speculum was removed and a wide area around the lesion on the left vulva was marked and a superficial incision made using a 10 blade scalpel.  The incision was carried  through the subcutaneous tissue with electrocautery.  The specimen was removed and sent for pathology.  The subcutaneous tissue was then closely re-approximated using 2-0 Vicryl in simple interrupted fashion.  The skin was closed with a series of simple interrupted sutures using 3-0 vicryl.  Hemostasis was noted.      The patient tolerated the procedure well, was extubated in the OR, and transferred to the PACU in stable condition.        Gregory Ramirez MD, MS    Department of Obstetrics and Gynecology   Division of Gynecologic Oncology   [facility]  Phone: [phone]

## 2020-01-17 NOTE — BRIEF OP NOTE
[facility]    Brief Operative Note    Pre-operative diagnosis: Vulvar intraepithelial neoplasia (DRE) grade 2 [N90.1]  Post-operative diagnosis Same as pre-operative diagnosis    Procedure: Procedure(s):  Exam Under Anesthesia, Coloposcopy of the Vagina, Cervix and Vulva, Wide Local Excision of the Vulva  Surgeon: Surgeon(s) and Role:     * Gregory Ramirez MD - Primary  Anesthesia: General   Estimated blood loss: 10  Drains: None  Specimens:   ID Type Source Tests Collected by Time Destination   A : Left Labia Majora (from 2 o'clock to 4o'clock) Stitch at 12 o'clock Tissue Vagina SURGICAL PATHOLOGY EXAM Gregory Ramirez MD 1/17/2020 10:22 AM      Findings:   Normal appearing cervix and vaginal vault. 2 x 3 cm left labial lesion present. Excised lesion with margins and repaired in two layers. Good hemostasis. Vaginal-rectal exam normal at the end of the case. .  Complications: None apparent..  Implants: * No implants in log *    Cass Morales MD  Gynecologic Oncology, PGY-1  Pager: [phone]

## 2020-01-17 NOTE — ANESTHESIA PREPROCEDURE EVALUATION
Anesthesia Pre-Procedure Evaluation    Patient: Edilia Atwood   MRN:     [id number] Gender:   female   Age:    54 year old :      1965        Preoperative Diagnosis: * No surgery found *        Past Medical History:   Diagnosis Date     Stroke (cerebrum) (H) 2016      Past Surgical History:   Procedure Laterality Date     HYSTEROSCOPY  10/30/2019     SHOULDER SURGERY Left 2000     TUBAL LIGATION            Anesthesia Evaluation     . Pt has had prior anesthetic. Type: General    History of anesthetic complications   - PONV        ROS/MED HX    ENT/Pulmonary:     (+)MORALES risk factors snores loudly, tobacco use, Current use , . .   (-) asthma and recent URI   Neurologic:     (+)CVA date: 2016 with deficits- visual changes/dizziness,     Cardiovascular:  - neg cardiovascular ROS   (+) ----. : . . . :. . Previous cardiac testing Echodate:8/15/2019results:date: results:ECG reviewed date:2017 results: date: results:          METS/Exercise Tolerance:  3 - Able to walk 1-2 blocks without stopping   Hematologic:  - neg hematologic  ROS      (-) history of blood clots and History of Transfusion   Musculoskeletal: Comment: Bilateral hands  (+) arthritis,  -       GI/Hepatic:  - neg GI/hepatic ROS       Renal/Genitourinary:  - ROS Renal section negative       Endo:  - neg endo ROS       Psychiatric:     (+) psychiatric history anxiety (h/o ETOH abuse)      Infectious Disease:  - neg infectious disease ROS      (-) Recent Fever   Malignancy:   (+)   Remote history of cervical dysplasia     - no malignancy   Other:    (+) no H/O Chronic Pain,                       PHYSICAL EXAM:   Mental Status/Neuro: A/A/O; Age Appropriate   Airway: Facies: Feasible  Mallampati: I  Mouth/Opening: Full  TM distance: > 6 cm  Neck ROM: Full   Respiratory: Auscultation: CTAB     Resp. Rate: Normal     Resp. Effort: Normal      CV: Rhythm: Regular  Rate: Age appropriate  Heart: Normal Sounds  Edema: None   Comments:   "    Dental: Dentures  Dentures: Upper; Lower; Complete                LABS:  CBC:   Lab Results   Component Value Date    WBC 4.9 01/02/2020    HGB 11.9 01/02/2020    HCT 40.1 01/02/2020     01/02/2020     BMP:   Lab Results   Component Value Date     01/02/2020    POTASSIUM 3.9 01/02/2020    CHLORIDE 108 01/02/2020    CO2 26 01/02/2020    BUN 14 01/02/2020    CR 0.75 01/02/2020     (H) 01/02/2020     COAGS: No results found for: PTT, INR, FIBR  POC: No results found for: BGM, HCG, HCGS  OTHER:   Lab Results   Component Value Date    SOLOMON 8.9 01/02/2020        Preop Vitals    BP Readings from Last 3 Encounters:   01/17/20 122/69   01/02/20 134/81   12/11/19 135/71    Pulse Readings from Last 3 Encounters:   01/17/20 76   01/02/20 79   12/11/19 72      Resp Readings from Last 3 Encounters:   01/17/20 16   01/02/20 16   12/11/19 14    SpO2 Readings from Last 3 Encounters:   01/17/20 100%   01/02/20 97%   12/11/19 99%      Temp Readings from Last 1 Encounters:   01/17/20 36.6  C (97.8  F) (Temporal)    Ht Readings from Last 1 Encounters:   01/17/20 1.727 m (5' 8\")      Wt Readings from Last 1 Encounters:   01/17/20 93 kg (205 lb)    Estimated body mass index is 31.17 kg/m  as calculated from the following:    Height as of an earlier encounter on 1/17/20: 1.727 m (5' 8\").    Weight as of an earlier encounter on 1/17/20: 93 kg (205 lb).     LDA:        Assessment:   ASA SCORE: 3      Smoking Status:  Active Smoker   NPO Status: NPO Appropriate     Plan:   Anes. Type:  General   Pre-Medication: None   Induction:  IV (Standard)   Airway: LMA   Access/Monitoring: PIV   Maintenance: Balanced     Postop Plan:   Postop Pain: Opioids  Postop Sedation/Airway: Not planned  Disposition: Outpatient     PONV Management:   Adult Risk Factors: Female, Postop Opioids   Prevention: Ondansetron     CONSENT: Direct conversation   Plan and risks discussed with: Patient   Blood Products: Consent Deferred (Minimal Blood " Loss)                  PAC Discussion and Assessment    ASA Classification: 3  Case is suitable for: ASC  Anesthetic techniques and relevant risks discussed: GA  Invasive monitoring and risk discussed: No  Types:   Possibility and Risk of blood transfusion discussed: No  NPO instructions given:   Additional anesthetic preparation and risks discussed:   Needs early admission to pre-op area:   Other:     PAC Resident/NP Anesthesia Assessment:  Edilia Atwood is a 54 year old female scheduled for EUA, colposcopy, WLE of vulva on 1/10/2020 by Dr. Ramirez in treatment of VIN2.  PAC referral for risk assessment and optimization for anesthesia with comorbid conditions of smoking, h/o CVA 2/2016, dizziness:    Pre-operative considerations:  1.  Cardiac:  Functional status- METS 4. Pt does her own housework, can climb a flight of stairs and walk 2 blocks without cardiopulmonary symptoms.  Low risk surgery with 0.9% (RCRI 1) risk of major adverse cardiac event.   -denies CP, SOB, WEBER, palpitaions  -Holter Monitor 11/13/19 - 11/14/19:   +Predominant rhythm: Sinus rhythm  +Heart rate range: 56 bpm to 146 bpm, average 81 bpm  +Symptom Diary : symptoms of lightheadedness, dizziness and   anxiety correlated with sinus rhythm or low grade sinus   tachycardia.    +There was one 4-beat run of regular SVT that likely was   atrial tachycardia.  There were no associated symptoms.    +There were rare PACs.  No PVC.   +No atrial fibrillation, pauses > 3 seconds or other   significant dysrhythmia.   -EKG 12/9/2017: SR  -Echo: 8/15/2019, EF 65%, no wall motion abnormalities    2.  Pulm:  Airway feasible.  MORALES risk: low, STOPBANG 2/8  -current smoker, one PPD sometimes less    3.  GI:  Risk of PONV score = 1.  If > 2, anti-emetic intervention recommended.    4.  Neuro  -h/o CVA 2/2016.  Pt has had symptom of dizziness since stroke.  Per her chart, she has not had syncope.  Symptoms occur randomly and mostly when seated.  Occasionally when  "she stands up.  She is followed by Dr. Lui for this with [facility].  Recent Holter Monitor, see #1 above.  Considering neurology referral.    5.  Psych  -h/o ETOH abuse.  Pt reports that she drinks 6 beers daily.  She denies ever having withdrawal symptoms when abstaining.  She states she has \"gone weeks without ETOH\".  -reports anxiety, no treatment    VTE risk: 1.8% due to FH of VTE      **For further details of assessment, testing, and physical exam please see H and P completed on same date.          Katherine Flanagan PA-C, MMS      Reviewed and Signed by PAC Mid-Level Provider/Resident  Mid-Level Provider/Resident: Katherine Flanagan  Date: 1/2/2020  Time:     Attending Anesthesiologist Anesthesia Assessment:        Anesthesiologist:   Date:   Time:   Pass/Fail:   Disposition:     PAC Pharmacist Assessment:        Pharmacist:   Date:   Time:    Vignesh Vazquez MD  "

## 2020-01-20 LAB — COPATH REPORT: NORMAL

## 2020-01-21 ENCOUNTER — CARE COORDINATION (OUTPATIENT)
Dept: ONCOLOGY | Facility: CLINIC | Age: 55
End: 2020-01-21

## 2020-01-21 NOTE — PROGRESS NOTES
"RN reached out to patient for post hospital call.     Patient denies any signs and symptoms of concern. Stated several times \"things are going great\".     Patient isn't needing pain medication and is eating and drinking well.      Patient truly denies pain.     Patient has no questions or concerns. Patient encouraged to call if any arise.     Selena Crawford RN    "

## 2020-02-03 ENCOUNTER — OFFICE VISIT (OUTPATIENT)
Dept: ONCOLOGY | Facility: CLINIC | Age: 55
End: 2020-02-03
Attending: OBSTETRICS & GYNECOLOGY
Payer: COMMERCIAL

## 2020-02-03 VITALS
OXYGEN SATURATION: 99 % | BODY MASS INDEX: 31.47 KG/M2 | SYSTOLIC BLOOD PRESSURE: 126 MMHG | TEMPERATURE: 98.2 F | RESPIRATION RATE: 16 BRPM | HEART RATE: 95 BPM | DIASTOLIC BLOOD PRESSURE: 90 MMHG | WEIGHT: 207 LBS

## 2020-02-03 DIAGNOSIS — D07.1 VULVAR INTRAEPITHELIAL NEOPLASIA III (VIN III): Primary | ICD-10-CM

## 2020-02-03 PROCEDURE — 99214 OFFICE O/P EST MOD 30 MIN: CPT | Mod: ZP | Performed by: OBSTETRICS & GYNECOLOGY

## 2020-02-03 PROCEDURE — G0463 HOSPITAL OUTPT CLINIC VISIT: HCPCS | Mod: ZF

## 2020-02-03 ASSESSMENT — PAIN SCALES - GENERAL: PAINLEVEL: NO PAIN (0)

## 2020-02-03 NOTE — NURSING NOTE
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    Weight as of this encounter: 93.9 kg (207 lb). Body surface area is 2.12 meters squared.  No Pain (0) Comment: Data Unavailable   No LMP recorded (lmp unknown). Patient is postmenopausal.  Allergies reviewed: Yes  Medications reviewed: Yes    Medications: Medication refills not needed today.  Pharmacy name entered into [facility]: [id number] - [place], [place] - [address] [facility] [address] [facility]    Clinical concerns: Post op       Shirley Gan CMA              "

## 2020-02-03 NOTE — LETTER
Date:February 14, 2020      Patient was self referred, no letter generated. Do not send.        [facility] Information

## 2020-02-11 NOTE — PROGRESS NOTES
Follow Up Notes on Referred Patient    Date: 2020       Dr. Gregory Ramirez MD  [address] [place] [address]       RE: Edilia Atwood  : 1965  ODELL: 2/3/2020    Dear Dr. 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    The patient presents today for followup.  She has been doing well since surgery.  She is eating and drinking well.  No nausea, vomiting, fever or chills.  Normal urinary and bowel function.  No vaginal bleeding or discharge.         Past Medical History:    Past Medical History:   Diagnosis Date     Stroke (cerebrum) (H) 2016         Past Surgical History:    Past Surgical History:   Procedure Laterality Date     EXCISE VULVA WIDE LOCAL N/A 2020    Procedure: Exam Under Anesthesia, Coloposcopy of the Vagina, Cervix and Vulva, Wide Local Excision of the Vulva;  Surgeon: Gregory Ramirez MD;  Location: [facility] OR     HYSTEROSCOPY  10/30/2019     SHOULDER SURGERY Left      TUBAL LIGATION           Health Maintenance Due   Topic Date Due     PREVENTIVE CARE VISIT  1965     HIV SCREENING  12/15/1981     PNEUMOCOCCAL IMMUNIZATION 19-64 MEDIUM RISK (1 of 1 - PPSV23) 12/15/1985     LIPID  12/15/2011     INFLUENZA VACCINE (1) 2019     PHQ-2  2020       Current Medications:     Current Outpatient Medications   Medication Sig Dispense Refill     acetaminophen (TYLENOL) 325 MG tablet Take 2 tablets (650 mg) by mouth every 4 hours as needed for mild pain 50 tablet 0     aspirin (ASA) 81 MG chewable tablet Take 81 mg by mouth every morning        pravastatin (PRAVACHOL) 10 MG tablet Take 10 mg by mouth every morning   0     Vitamin D3 (VITAMIN D3) 25 mcg (1000 units) tablet Take 1,000 mcg by mouth daily            Allergies:        Allergies   Allergen Reactions     Chicken-Derived Products (Egg) Nausea and Vomiting     Codeine      faints       Tramadol      fainting        Social History:      Social History     Tobacco Use     Smoking status: Current Every Day Smoker     Types: Cigarettes     Smokeless tobacco: Never Used     Tobacco comment: 1ppd   Substance Use Topics     Alcohol use: Yes     Drinks per session: 5 or 6     Comment: 6 beers per day       History   Drug Use Unknown         Family History:       Family History   Problem Relation Age of Onset     Emphysema Mother      Heart Disease Mother      Diabetes Mother      Lung Cancer Father          Physical Exam:     BP (!) 126/90   Pulse 95   Temp 98.2  F (36.8  C) (Oral)   Resp 16   Wt 93.9 kg (207 lb)   LMP  (LMP Unknown)   SpO2 99%   Breastfeeding No   BMI 31.47 kg/m    Body mass index is 31.47 kg/m .    General Appearance: healthy and alert, no distress     Musculoskeletal: extremities non tender and without edema    Neurological: normal gait, no gross defects     Psychiatric: appropriate mood and affect                               ABDOMEN:  Soft, nontender, nondistended, no organomegaly.   PELVIC:  External genitalia has well-healing wide local excision site.  No signs of infection.           Assessment:    Edilia Atwood is a 54 year old woman with a diagnosis of DRE 3.     A total of 25 minutes was spent with the patient, 20 minutes of which were spent in counseling the patient and/or treatment planning.    Reviewed with the patient the pathology results.  She has VIN3, has no invasive carcinoma.  Discussed I will see her back in 4 months for another colposcopy followup.  All margins were negative.  The patient agrees with the plan, is very appreciative of her care.  All questions were answered.           Purvi Goff MD, MS    Department of Obstetrics and Gynecology   Division of Gynecologic Oncology   [facility]  Phone: [phone]        CC  No care team member to display  PURVI GOFF

## (undated) DEVICE — SU VICRYL 3-0 PS-2 18" UND J497G

## (undated) DEVICE — SU SILK 3-0 SH 30" K832H

## (undated) DEVICE — PANTIES MESH LG/XLG 2PK 706M2

## (undated) DEVICE — GLOVE PROTEXIS BLUE W/NEU-THERA 8.0  2D73EB80

## (undated) DEVICE — GLOVE PROTEXIS MICRO 7.5  2D73PM75

## (undated) DEVICE — SUCTION MANIFOLD NEPTUNE 2 SYS 1 PORT 702-025-000

## (undated) DEVICE — SU VICRYL 2-0 CT-2 27" J333H

## (undated) DEVICE — LINEN TOWEL PACK X5 5464

## (undated) DEVICE — PACK VAG HYST

## (undated) RX ORDER — CEFAZOLIN SODIUM 1 G/3ML
INJECTION, POWDER, FOR SOLUTION INTRAMUSCULAR; INTRAVENOUS
Status: DISPENSED
Start: 2020-01-17

## (undated) RX ORDER — PHENAZOPYRIDINE HYDROCHLORIDE 100 MG/1
TABLET, FILM COATED ORAL
Status: DISPENSED
Start: 2020-01-17

## (undated) RX ORDER — DEXAMETHASONE SODIUM PHOSPHATE 4 MG/ML
INJECTION, SOLUTION INTRA-ARTICULAR; INTRALESIONAL; INTRAMUSCULAR; INTRAVENOUS; SOFT TISSUE
Status: DISPENSED
Start: 2020-01-17

## (undated) RX ORDER — LIDOCAINE HYDROCHLORIDE 20 MG/ML
INJECTION, SOLUTION EPIDURAL; INFILTRATION; INTRACAUDAL; PERINEURAL
Status: DISPENSED
Start: 2020-01-17

## (undated) RX ORDER — PROPOFOL 10 MG/ML
INJECTION, EMULSION INTRAVENOUS
Status: DISPENSED
Start: 2020-01-17

## (undated) RX ORDER — GABAPENTIN 300 MG/1
CAPSULE ORAL
Status: DISPENSED
Start: 2020-01-17

## (undated) RX ORDER — KETOROLAC TROMETHAMINE 30 MG/ML
INJECTION, SOLUTION INTRAMUSCULAR; INTRAVENOUS
Status: DISPENSED
Start: 2020-01-17

## (undated) RX ORDER — ONDANSETRON 2 MG/ML
INJECTION INTRAMUSCULAR; INTRAVENOUS
Status: DISPENSED
Start: 2020-01-17

## (undated) RX ORDER — EPHEDRINE SULFATE 50 MG/ML
INJECTION, SOLUTION INTRAMUSCULAR; INTRAVENOUS; SUBCUTANEOUS
Status: DISPENSED
Start: 2020-01-17

## (undated) RX ORDER — ACETAMINOPHEN 325 MG/1
TABLET ORAL
Status: DISPENSED
Start: 2020-01-17

## (undated) RX ORDER — FENTANYL CITRATE 50 UG/ML
INJECTION, SOLUTION INTRAMUSCULAR; INTRAVENOUS
Status: DISPENSED
Start: 2020-01-17